# Patient Record
(demographics unavailable — no encounter records)

---

## 2024-11-01 NOTE — DISCUSSION/SUMMARY
[FreeTextEntry1] : 10 yo F with viral URI with cough. Exam reassuring with lungs CTAB.  Encouraged parent/patient to: - provide adequate fluid intake to remain well hydrated i.e tea with honey, soups, smoothies, ice pops - can try OTC cough syrups to help since taste of medicine is difficult for Marianne - warm steam showers to help clear up some of the congestion - rest and do not exert yourself  - get at least 8 hours of sleep per night to help with recovery  * Declined testing  Parent verbalized understanding and all questions addressed. Return to the office if symptoms worsen. Return precautions provided regarding signs of severe respiratory distress.

## 2024-11-01 NOTE — HISTORY OF PRESENT ILLNESS
[de-identified] : cough [FreeTextEntry6] : Seen in UC 4 days ago with sore throat, body aches, runny nose. COVId negative. Now for last two days developed a cough as well. The cough is mostly dry and bothersome at night. Has not tried any medication for the cough as Marianne does not like to take medicine. No difficulty breathing but bc of cough was sent home from school yesterday. PO intake normal.

## 2024-12-09 NOTE — PHYSICAL EXAM
[Erythema] : erythema [Bulging] : bulging [Purulent Effusion] : purulent effusion [NL] : regular rate and rhythm, normal S1, S2 audible, no murmurs

## 2024-12-09 NOTE — DISCUSSION/SUMMARY
[FreeTextEntry1] : ROM - Complete 10 days of antibiotic.  - Provide ibuprofen as needed for pain or fever. - If no improvement within 48 hours call the office - can use flonase for nasal congestion if worsens QD at night for 5-7 days  * No testing wanted  All questions addressed.

## 2024-12-09 NOTE — HISTORY OF PRESENT ILLNESS
[de-identified] : ear pain [FreeTextEntry6] : 12 yo F with 1 day of ear pain on Rt. Went to school today but came home due to pain. Has some nasal congestion but no cough or fever. Po intake normal.

## 2025-05-01 NOTE — ASSESSMENT
[FreeTextEntry1] : Marianne is an 10 y/o female with hx of ADHD, combined type, and anxiety, and is here for an initial visit for management recommendations.   She was diagnosed with ADHD in 2019 by psychiatrist at [facility] [place] and has tried various stimulants and Strattera.  She is taking Lexapro for anxiety however no longer seeing therapist or psychiatrist as patient/parent did not feel it was a good fit.   Lainey is struggling with ADHD symptoms, as well as severe anxiety and depression.   ADHD often co-exists with anxiety and depression.  She is having suicidal ideation, with history of soft attempts.  Mother is appropriately supportive and aware of risk of suicide.  Safety planning was done.  Patient felt to be at low risk of self harm at this time.  Protective factors include supportive family and willingness to engage in psychotherapy.   Will start SSRI (sertraline).  She is also c/o ADHD symptoms, and shall start Adderall XR 10 mg.

## 2025-05-01 NOTE — PHYSICAL EXAM
[Well-appearing] : well-appearing [Alert] : alert [Well related, good eye contact] : well related, good eye contact [Conversant] : conversant [Normal speech and language] : normal speech and language [Follows instructions well] : follows instructions well [Gross hearing intact] : gross hearing intact [de-identified] : Interactive and appropriate mood, friendly.  Appropriately tearful when discussed anxiety/depression symptoms.

## 2025-05-01 NOTE — PLAN
[FreeTextEntry1] : ANXIETY/DEPRESSION PLAN PSYCHOEDUCATION -Patient family psychoeducation for anxiety/depression; common treatable condition, risk for suicide  -web based resources provided [employer]ORD[email or web address], [employer] ([employer])  -Safety planning- Parents encouraged to "sanitize" the home, when to call 911 or take to [facility] Walk-in, ED,  crisis text in your phone [phone]  -Discussed behavioral activation, basic interventions to help one self: Exercise, nutrition, sleep, spend time outdoors, spend time with friends, spend time relaxing.   TREATMENT PLAN  - Referral for psychotherapist:    1st line treatment for mild to moderate depression is psychotherapy such as CBT and interpersonal therapy (IPT).   -Medication:    Medication (SSRI) in combination with psychotherapy recommended for moderate to severe depression.  Can   take 4-8 weeks to have full efficacy.  Cannot be stopped  suddenly. -Start sertraline 25 mg. Increase to 50 mg in 1 week.  Call office if any concerns about mood or side effects.  Check back in 2 weeks.  -Follow up visit in 1 month.   ADHD PLAN -Start Adderall XR 10 mg.   -Psychoeducation provided re: ADHD.  Resources for education given -Discussed management for ADHD including behavioral interventions and medication options. -Counseling on ADHD medication; stimulants are 1st line treatment. Discussed stimulant medications; risks, benefits, possible side effects. -Follow up with cardiologist as per yearly routine (last f/u 11/2024 for small VSD-no restrictions).  -Follow up in 1 month

## 2025-05-01 NOTE — HISTORY OF PRESENT ILLNESS
[FreeTextEntry1] : Marianne is an 12 y/o female with hx of ADHD, combined type, and anxiety, and is here for an initial visit for management recommendations.  EHR RECORDS REVIEWD Hx of ADHD: [place] by psychiatrist [place] Sevilla [place] [facility] [place]. Tried Focalin ER in 2020, caused mood swings. Tried non-stimulant for a while, then stopped. Has tried various stimulants and non-stimulants.  Straterra worked however developed an aversion to swallowing.  She was started on Lexapro for anxiety and was seeing a therapist in 2024. From well visit 08/30/24: "History of ADHD; stopped medication developed aversion to pills, felt pill was stuck in her throat and felt very sick, patient tries multiple stimulants with no improvement the best improvement was with Strattera but then developed the aversion. Patient f/u with [place] counseling with therapist Brit. Lexapro 5mg daily" Hx of VSD and innocent heart murmur evaluated by cardiologist Dr Jeong 07/07/17. Last seen 11/2023, annual f/u No physical restrictions no antibiotic ppx needed.   HPI Patient lives with parents and attends 6th grade in [employer] elementary school, and has a 504 Plan for ADHD.  Mother brings her in today as she has been struggling more with focusing and would like to try ADHD medication again as she will be starting high school next fall.  She was previously followed by therapist and psychiatrist at [facility].  Mother endorses she is no longer going as Marianne never connected with therapists there.   Mother reports she starting taking Lexapro 10 mg over 2 years ago for anxiety.  She had a phobia of swallowing pills and was also getting bullied in school.   Had phobia of pills in 2021, resolved in 2022.  She feels her anxiety is much better than before.  Was taking Strattera 25 mg for ADHD, previous to swallowing phobia. She needed to take pill with whip cream for 2 years, the only why she could swallow it, until recently.  Now no issues with swallowing pills with water. She was doing well in school until 6th grade.  Her grades are good however she is having more problems with focusing. Worried about starting high school next year and would like to try a stimulant again.  She tried a few ADHD medications when she was 7 years old and negative responses, emotionality, crashing at the end of the day.  Has tried Focalin XR 10 mg and Concerta.    On collateral, Marianne expressed she is having problems staying focused in school and when doing her homework.  She gets easily distracted as well.  She also expressed feeling anxious and depressed.  She has been getting bullied in school off and on.  On girl told recently told her she should go kill herself, and Marianne reported this to her guidance counselor.   The child was suspended for 3 days.  When completing PHQ9 and GAD7 scores (found to be severe), she reported she has had suicidal thoughts. She has though about stabbing herself in the heart.  She endorses she tried to kill herself over a year ago; she described 2 soft attempts: once punching herself in the face which left a bruise.  Another time she jumped off the top bunk bed and hurt her foot. Her mother was not aware of this.  Marianne expressed she tries to stay positive in school but then comes home and feels sad.  She doesn't have close friends in her school, but does have friends outside of school.  She endorses she would not want to go through with suicide as she thinks about how it would affect her parents and brother.  She reports her mother is very close to her and she can share things with her but didn't want to worry her.  Mother was visibly upset by this and was very sympathetic and appropriately concerned and supportive.    DEVELOPMENTAL HX Child was born full term without complications and reached all age-appropriate developmental milestones without concerns.  Early Intervention Services were not neededf   CURRENT SCHOOL -School: [employer]  -Public school  -Special Ed services- 504 plan  -  Classification: [place] - General education classroom -Accommodations- testing, counseling 1 x/week -Academic performance/grades: Very good     RELATIONSHIPS: Gets along well with friends, parents. Bullied in school   EMOTIONAL GAD7=17 PHQ9=20 (severe range depression)+SI, soft SA.   MEDICAL HISTORY: Denies a history of tics Denies history of starting spells, twitching, seizure-like activity. Hx of VSD- cleared by cardiologist- no restrictions.  Followed yearly.    FAMILY HISTORY: Family history of ADHD: brother, mother Family history of anxiety or depression: parents Denies family history of cardiac conditions or early unexplained deaths.

## 2025-05-01 NOTE — CONSULT LETTER
[Courtesy Letter:] : I had the pleasure of seeing your patient, [unfilled], in my office today. [Please see my note below.] : Please see my note below. [Sincerely,] : Sincerely, [FreeTextEntry3] : Cathy Mauricio, PNP-PC, [facility] Division of Pediatric Neurology [address], [id number] [phone]

## 2025-05-30 NOTE — HISTORY OF PRESENT ILLNESS
[FreeTextEntry1] : Marianne is an 12 y/o female with hx of ADHD, combined type, and anxiety, and is here for a follow s/p seen 05/01/25 for an initial visit for management recommendations.   She was diagnosed with ADHD in 2019 by psychiatrist at [facility] [place] [place] and has tried various stimulants and Strattera.  She was  taking Lexapro for anxiety however no longer seeing therapist or psychiatrist as patient/parent did not feel it was a good fit.   Marianne has been struggling with ADHD symptoms, as well as severe anxiety and depression.  Lexapro was increased to 20 mg  and she was started on Adderall XR 10 mg..  Patient felt to be at low risk of self harm at this time.    PLAN FROM PREVIOUS VISIT  ANXIETY/DEPRESSION PLAN  - Referral for psychotherapist:  -Increase Lexapro to 20 mg.  ADHD PLAN -Start Adderall XR 10 mg.   -Follow up with cardiologist as per yearly routine (last f/u 11/2024 for small VSD-no restrictions).  -Follow up in 1 month  INTERIM HPI  ____________________________________ HPI FROM VISIT ON 05/01/25 (INITIAL)  Marianne is an 12 y/o female with hx of ADHD, combined type, and anxiety, and is here for an initial visit for management recommendations.  EHR RECORDS REVIEWD Hx of ADHD: Followed by psychiatrist Dr Sevilla in 2019 at [facility] [place] [place]. Tried Focalin ER in 2020, caused mood swings. Tried non-stimulant for a while, then stopped. Has tried various stimulants and non-stimulants.  Straterra worked however developed an aversion to swallowing.  She was started on Lexapro for anxiety and was seeing a therapist in 2024. From well visit 08/30/24: "History of ADHD; stopped medication developed aversion to pills, felt pill was stuck in her throat and felt very sick, patient tries multiple stimulants with no improvement the best improvement was with Strattera but then developed the aversion. Patient f/u with [place] counseling with therapist Brit. Lexapro 5mg daily" Hx of VSD and innocent heart murmur evaluated by cardiologist Dr Jeong 07/07/17. Last seen 11/2023, annual f/u No physical restrictions no antibiotic ppx needed.   HPI Patient lives with parents and attends 6th grade in [employer] elementary school, and has a 504 Plan for ADHD.  Mother brings her in today as she has been struggling more with focusing and would like to try ADHD medication again as she will be starting high school next fall.  She was previously followed by therapist and psychiatrist at [facility].  Mother endorses she is no longer going as Marianne never connected with therapists there.   Mother reports she starting taking Lexapro 10 mg over 2 years ago for anxiety.  She had a phobia of swallowing pills and was also getting bullied in school.   Had phobia of pills in 2021, resolved in 2022.  She feels her anxiety is much better than before.  Was taking Strattera 25 mg for ADHD, previous to swallowing phobia. She needed to take pill with whip cream for 2 years, the only why she could swallow it, until recently.  Now no issues with swallowing pills with water. She was doing well in school until 6th grade.  Her grades are good however she is having more problems with focusing. Worried about starting high school next year and would like to try a stimulant again.  She tried a few ADHD medications when she was 7 years old and negative responses, emotionality, crashing at the end of the day.  Has tried Focalin XR 10 mg and Concerta.    On collateral, Marianne expressed she is having problems staying focused in school and when doing her homework.  She gets easily distracted as well.  She also expressed feeling anxious and depressed.  She has been getting bullied in school off and on.  On girl told recently told her she should go kill herself, and Marianne reported this to her guidance counselor.   The child was suspended for 3 days.  When completing PHQ9 and GAD7 scores (found to be severe), she reported she has had suicidal thoughts. She has though about stabbing herself in the heart.  She endorses she tried to kill herself over a year ago; she described 2 soft attempts: once punching herself in the face which left a bruise.  Another time she jumped off the top bunk bed and hurt her foot. Her mother was not aware of this.  Marianne expressed she tries to stay positive in school but then comes home and feels sad.  She doesn't have close friends in her school, but does have friends outside of school.  She endorses she would not want to go through with suicide as she thinks about how it would affect her parents and brother.  She reports her mother is very close to her and she can share things with her but didn't want to worry her.  Mother was visibly upset by this and was very sympathetic and appropriately concerned and supportive.    DEVELOPMENTAL HX Child was born full term without complications and reached all age-appropriate developmental milestones without concerns.  Early Intervention Services were not neededf   CURRENT SCHOOL -School: [employer]  -Public school  -Special Ed services- 504 plan  -  Classification: [place] - General education classroom -Accommodations- testing, counseling 1 x/week -Academic performance/grades: Very good     RELATIONSHIPS: Gets along well with friends, parents. Bullied in school   EMOTIONAL GAD7=17 PHQ9=20 (severe range depression)+SI, soft SA.   MEDICAL HISTORY: Denies a history of tics Denies history of starting spells, twitching, seizure-like activity. Hx of VSD- cleared by cardiologist- no restrictions.  Followed yearly.    FAMILY HISTORY: Family history of ADHD: brother, mother Family history of anxiety or depression: parents Denies family history of cardiac conditions or early unexplained deaths.

## 2025-05-30 NOTE — ASSESSMENT
[FreeTextEntry1] : Marianne is an 11 yr 11 month old female with hx of ADHD, combined type, and anxiety, and is here for a follow s/p seen 05/01/25 for an initial visit for management recommendations.   [place] [facility] [place] various stimulants and Strattera.  She was previously taking Lexapro for anxiety however no longer seeing therapist or psychiatrist as patient/parent did not feel it was a good fit.   Marianne has been struggling with ADHD symptoms, as well as severe anxiety and depression.  Lexapro was increased to 20 mg and she was started on Adderall XR 10 mg..  Patient felt to be at low risk of self harm at this time.

## 2025-05-30 NOTE — PLAN
[FreeTextEntry1] : ANXIETY/DEPRESSION PLAN -Sertralne --- -Reviewed safety planning - Referral for psychotherapist:  -Follow up visit in 1 month.   ADHD PLAN -Start Adderall XR 10 mg.   -Discussed stimulant medications; risks, benefits, possible side effects. -Follow up with cardiologist as per yearly routine (last f/u 11/2024 for small VSD-no restrictions).  -Follow up in 1 month

## 2025-06-09 NOTE — PHYSICAL EXAM
[Well-appearing] : well-appearing [No dysmorphic facial features] : no dysmorphic facial features [Alert] : alert [Well related, good eye contact] : well related, good eye contact [Conversant] : conversant [Normal speech and language] : normal speech and language [Gross hearing intact] : gross hearing intact [de-identified] : Interactive and appropriate mood, friendly, cheerful.

## 2025-06-09 NOTE — ASSESSMENT
[FreeTextEntry1] : Marianne is an almost 13 y/o old female with hx of ADHD, combined type, and anxiety, and is here for a follow s/p seen 05/01/25 for an initial visit for management recommendations.  At previous visit Marianne was struggling with ADHD symptoms as well as severe anxiety and depression, thus Lexapro was increased to 20 mg, and she was started on Adderall XR 10 mg.   [place] [facility] [place] tried various stimulants and Strattera.  She was previously taking Lexapro for anxiety however no longer seeing therapist or psychiatrist. Adderall XR 10 mg has been very effective and well tolerated.  Dosage increased to 15 mg as patient c/o it wears off in school during last period.   Lexapro 20 mg has had a good improvement in her mood as per patient and her father, with significantly less SI. Will continue current medication plan.   Emphasized importance of finding a therapist as medication combined with psychotherapy/CBT most effective.

## 2025-06-09 NOTE — PLAN
[FreeTextEntry1] : -Increase Adderall XR to 15 mg once daily in the AM.  -Continue Lexapro (escitalopram) 20 mg once daily in the AM. - Referral for psychotherapist:  -Discussed stimulant medications; risks, benefits, possible side effects. -Follow up with cardiologist as per yearly routine (last f/u 11/2024 for small VSD-no restrictions).  -Follow up prior to start of new school year (August 2025)

## 2025-06-09 NOTE — PHYSICAL EXAM
[Well-appearing] : well-appearing [No dysmorphic facial features] : no dysmorphic facial features [Alert] : alert [Well related, good eye contact] : well related, good eye contact [Conversant] : conversant [Normal speech and language] : normal speech and language [Gross hearing intact] : gross hearing intact [de-identified] : Interactive and appropriate mood, friendly, cheerful.

## 2025-06-09 NOTE — REASON FOR VISIT
[Father] : father [Home] : at home, [unfilled] , at the time of the visit. [Medical Office: ([facility])___] : at the medical office located in  [Telehealth (audio & video)] : This visit was provided via telehealth using real-time 2-way audio visual technology. [Patient] : patient [Mother] : mother [Other: ____] : [unfilled] [FreeTextEntry3] : Father

## 2025-06-09 NOTE — ASSESSMENT
[FreeTextEntry1] : Marianne is an almost 13 y/o old female with hx of ADHD, combined type, and anxiety, and is here for a follow s/p seen 05/01/25 for an initial visit for management recommendations.  At previous visit Marianne was struggling with ADHD symptoms as well as severe anxiety and depression, thus Lexapro was increased to 20 mg, and she was started on Adderall XR 10 mg.   She [place] [facility] [place] has tried various stimulants and Strattera.  She was previously taking Lexapro for anxiety however no longer seeing therapist or psychiatrist. Adderall XR 10 mg has been very effective and well tolerated.  Dosage increased to 15 mg as patient c/o it wears off in school during last period.   Lexapro 20 mg has had a good improvement in her mood as per patient and her father, with significantly less SI. Will continue current medication plan.   Emphasized importance of finding a therapist as medication combined with psychotherapy/CBT most effective.

## 2025-06-09 NOTE — REASON FOR VISIT
[Father] : father [Home] : at home, [unfilled] , at the time of the visit. [Medical Office: ([facility])___] : at the medical office located in  [Telehealth (audio & video)] : This visit was provided via telehealth using real-time 2-way audio visual technology. [Patient] : patient [Mother] : mother [Other: ____] : [unfilled] [FreeTextEntry3] : Father

## 2025-06-09 NOTE — HISTORY OF PRESENT ILLNESS
[FreeTextEntry1] : Marianne is an 10 y/o female with hx of ADHD, combined type, and anxiety, and is here for a follow s/p seen 05/01/25 for an initial visit for management recommendations.   She was diagnosed with ADHD in 2019 by psychiatrist at [facility] and has tried various stimulants and Strattera.  She was  taking Lexapro for anxiety however no longer seeing therapist or psychiatrist as patient/parent did not feel it was a good fit.   Marianne has been struggling with ADHD symptoms, as well as severe anxiety and depression.  Lexapro was increased to 20 mg  and she was started on Adderall XR 10 mg..  Patient felt to be at low risk of self harm at this time.    PLAN FROM PREVIOUS VISIT  - Referral for psychotherapist:  -Increase Lexapro to 20 mg. -Start Adderall XR 10 mg.   -Follow up with cardiologist as per yearly routine (last f/u 11/2024 for small VSD-no restrictions).  -Follow up in 1 month  INTERIM HPI Feeling more focused in school since starting Adderall XR 10 mg.  In the beginning noticed a decreased appetite, no longer.  Denies other side effects.  Core endorses her grades are "way better."  She feels in last period Adderall wears off and she can't focus anymore.  Asking if can try a higher dosage.Takes a 6:50 am.  Marianne endorses her mood is "ok,"  has had occasional SI however significantly better than before. Her mother is stil looking for a therapist.  Father endorses he sees a very good improvement in her mood.    ____________________________________ HPI FROM VISIT ON 05/01/25 (INITIAL)  Marianne is an 10 y/o female with hx of ADHD, combined type, and anxiety, and is here for an initial visit for management recommendations.  EHR RECORDS REVIEWD Hx of ADHD: Followed by psychiatrist Dr Sevilla in 2019 at [facility]. Tried Focalin ER in 2020, caused mood swings. Tried non-stimulant for a while, then stopped. Has tried various stimulants and non-stimulants.  Straterra worked however developed an aversion to swallowing.  She was started on Lexapro for anxiety and was seeing a therapist in 2024. From well visit 08/30/24: "History of ADHD; stopped medication developed aversion to pills, felt pill was stuck in her throat and felt very sick, patient tries multiple stimulants with no improvement the best improvement was with Strattera but then developed the aversion. Patient f/u with [place] counseling with therapist Brit. Lexapro 5mg daily" Hx of VSD and innocent heart murmur evaluated by cardiologist Dr Jeong 07/07/17. Last seen 11/2023, annual f/u No physical restrictions no antibiotic ppx needed.   HPI Patient lives with parents and attends 6th grade in [employer], and has a 504 Plan for ADHD.  Mother brings her in today as she has been struggling more with focusing and would like to try ADHD medication again as she will be starting high school next fall.  She was previously followed by therapist and psychiatrist at [facility].  Mother endorses she is no longer going as Marianne never connected with therapists there.   Mother reports she starting taking Lexapro 10 mg over 2 years ago for anxiety.  She had a phobia of swallowing pills and was also getting bullied in school.   Had phobia of pills in 2021, resolved in 2022.  She feels her anxiety is much better than before.  Was taking Strattera 25 mg for ADHD, previous to swallowing phobia. She needed to take pill with whip cream for 2 years, the only why she could swallow it, until recently.  Now no issues with swallowing pills with water. She was doing well in school until 6th grade.  Her grades are good however she is having more problems with focusing. Worried about starting high school next year and would like to try a stimulant again.  She tried a few ADHD medications when she was 7 years old and negative responses, emotionality, crashing at the end of the day.  Has tried Focalin XR 10 mg and Concerta.    On collateral, Marianne expressed she is having problems staying focused in school and when doing her homework.  She gets easily distracted as well.  She also expressed feeling anxious and depressed.  She has been getting bullied in school off and on.  On girl told recently told her she should go kill herself, and Marianne reported this to her guidance counselor.   The child was suspended for 3 days.  When completing PHQ9 and GAD7 scores (found to be severe), she reported she has had suicidal thoughts. She has though about stabbing herself in the heart.  She endorses she tried to kill herself over a year ago; she described 2 soft attempts: once punching herself in the face which left a bruise.  Another time she jumped off the top bunk bed and hurt her foot. Her mother was not aware of this.  Marianne expressed she tries to stay positive in school but then comes home and feels sad.  She doesn't have close friends in her school, but does have friends outside of school.  She endorses she would not want to go through with suicide as she thinks about how it would affect her parents and brother.  She reports her mother is very close to her and she can share things with her but didn't want to worry her.  Mother was visibly upset by this and was very sympathetic and appropriately concerned and supportive.    DEVELOPMENTAL HX Child was born full term without complications and reached all age-appropriate developmental milestones without concerns.  Early Intervention Services were not neededf   CURRENT SCHOOL -School: [employer]  -Public school  -Special Ed services- 504 plan  -  Classification: OHI - General education classroom -Accommodations- testing, counseling 1 x/week -Academic performance/grades: Very good     RELATIONSHIPS: Gets along well with friends, parents. Bullied in school   EMOTIONAL GAD7=17 PHQ9=20 (severe range depression)+SI, soft SA.   MEDICAL HISTORY: Denies a history of tics Denies history of starting spells, twitching, seizure-like activity. Hx of VSD- cleared by cardiologist- no restrictions.  Followed yearly.    FAMILY HISTORY: Family history of ADHD: brother, mother Family history of anxiety or depression: parents Denies family history of cardiac conditions or early unexplained deaths.

## 2025-06-09 NOTE — HISTORY OF PRESENT ILLNESS
[FreeTextEntry1] : Marianne is an 12 y/o female with hx of ADHD, combined type, and anxiety, and is here for a follow s/p seen 05/01/25 for an initial visit for management recommendations.   She was diagnosed with ADHD in 2019 by psychiatrist at [facility] and has tried various stimulants and Strattera.  She was  taking Lexapro for anxiety however no longer seeing therapist or psychiatrist as patient/parent did not feel it was a good fit.   Marianne has been struggling with ADHD symptoms, as well as severe anxiety and depression.  Lexapro was increased to 20 mg  and she was started on Adderall XR 10 mg..  Patient felt to be at low risk of self harm at this time.    PLAN FROM PREVIOUS VISIT  - Referral for psychotherapist:  -Increase Lexapro to 20 mg. -Start Adderall XR 10 mg.   -Follow up with cardiologist as per yearly routine (last f/u 11/2024 for small VSD-no restrictions).  -Follow up in 1 month  INTERIM HPI Feeling more focused in school since starting Adderall XR 10 mg.  In the beginning noticed a decreased appetite, no longer.  Denies other side effects.  Core endorses her grades are "way better."  She feels in last period Adderall wears off and she can't focus anymore.  Asking if can try a higher dosage.Takes a 6:50 am.  Marianne endorses her mood is "ok,"  has had occasional SI however significantly better than before. Her mother is stil looking for a therapist.  Father endorses he sees a very good improvement in her mood.    ____________________________________ HPI FROM VISIT ON 05/01/25 (INITIAL)  Marianne is an 12 y/o female with hx of ADHD, combined type, and anxiety, and is here for an initial visit for management recommendations.  EHR RECORDS REVIEWD Hx of ADHD: Followed by psychiatrist Dr Sevilla in 2019 at [facility]. Tried Focalin ER in 2020, caused mood swings. Tried non-stimulant for a while, then stopped. Has tried various stimulants and non-stimulants.  Straterra worked however developed an aversion to swallowing.  She was started on Lexapro for anxiety and was seeing a therapist in 2024. From well visit 08/30/24: "History of ADHD; stopped medication developed aversion to pills, felt pill was stuck in her throat and felt very sick, patient tries multiple stimulants with no improvement the best improvement was with Strattera but then developed the aversion. Patient f/u with [facility] counseling with therapist Brit. Lexapro 5mg daily" Hx of VSD and innocent heart murmur evaluated by cardiologist Dr Jeong 07/07/17. Last seen 11/2023, annual f/u No physical restrictions no antibiotic ppx needed.   HPI Patient lives with parents and attends 6th grade in [employer], and has a 504 Plan for ADHD.  Mother brings her in today as she has been struggling more with focusing and would like to try ADHD medication again as she will be starting high school next fall.  She was previously followed by therapist and psychiatrist at [facility].  Mother endorses she is no longer going as Marianne never connected with therapists there.   Mother reports she starting taking Lexapro 10 mg over 2 years ago for anxiety.  She had a phobia of swallowing pills and was also getting bullied in school.   Had phobia of pills in 2021, resolved in 2022.  She feels her anxiety is much better than before.  Was taking Strattera 25 mg for ADHD, previous to swallowing phobia. She needed to take pill with whip cream for 2 years, the only why she could swallow it, until recently.  Now no issues with swallowing pills with water. She was doing well in school until 6th grade.  Her grades are good however she is having more problems with focusing. Worried about starting high school next year and would like to try a stimulant again.  She tried a few ADHD medications when she was 7 years old and negative responses, emotionality, crashing at the end of the day.  Has tried Focalin XR 10 mg and Concerta.    On collateral, Marianne expressed she is having problems staying focused in school and when doing her homework.  She gets easily distracted as well.  She also expressed feeling anxious and depressed.  She has been getting bullied in school off and on.  On girl told recently told her she should go kill herself, and Marianne reported this to her guidance counselor.   The child was suspended for 3 days.  When completing PHQ9 and GAD7 scores (found to be severe), she reported she has had suicidal thoughts. She has though about stabbing herself in the heart.  She endorses she tried to kill herself over a year ago; she described 2 soft attempts: once punching herself in the face which left a bruise.  Another time she jumped off the top bunk bed and hurt her foot. Her mother was not aware of this.  Marianne expressed she tries to stay positive in school but then comes home and feels sad.  She doesn't have close friends in her school, but does have friends outside of school.  She endorses she would not want to go through with suicide as she thinks about how it would affect her parents and brother.  She reports her mother is very close to her and she can share things with her but didn't want to worry her.  Mother was visibly upset by this and was very sympathetic and appropriately concerned and supportive.    DEVELOPMENTAL HX Child was born full term without complications and reached all age-appropriate developmental milestones without concerns.  Early Intervention Services were not neededf   CURRENT SCHOOL -School: [employer]  -Public school  -Special Ed services- 504 plan  -  Classification: OHI - General education classroom -Accommodations- testing, counseling 1 x/week -Academic performance/grades: Very good     RELATIONSHIPS: Gets along well with friends, parents. Bullied in school   EMOTIONAL GAD7=17 PHQ9=20 (severe range depression)+SI, soft SA.   MEDICAL HISTORY: Denies a history of tics Denies history of starting spells, twitching, seizure-like activity. Hx of VSD- cleared by cardiologist- no restrictions.  Followed yearly.    FAMILY HISTORY: Family history of ADHD: brother, mother Family history of anxiety or depression: parents Denies family history of cardiac conditions or early unexplained deaths.